# Patient Record
Sex: MALE | URBAN - METROPOLITAN AREA
[De-identification: names, ages, dates, MRNs, and addresses within clinical notes are randomized per-mention and may not be internally consistent; named-entity substitution may affect disease eponyms.]

---

## 2022-08-16 ENCOUNTER — APPOINTMENT (OUTPATIENT)
Age: 62
Setting detail: DERMATOLOGY
End: 2022-08-16

## 2022-08-16 DIAGNOSIS — M70.2 OLECRANON BURSITIS: ICD-10-CM

## 2022-08-16 PROBLEM — M70.22 OLECRANON BURSITIS, LEFT ELBOW: Status: ACTIVE | Noted: 2022-08-16

## 2022-08-16 PROCEDURE — 20605 DRAIN/INJ JOINT/BURSA W/O US: CPT

## 2022-08-16 PROCEDURE — OTHER MIPS QUALITY: OTHER

## 2022-08-16 PROCEDURE — OTHER COUNSELING: OTHER

## 2022-08-16 PROCEDURE — OTHER JOINT/BURSA ASPIRATION: OTHER

## 2022-08-16 ASSESSMENT — LOCATION ZONE DERM
LOCATION ZONE: ARM
LOCATION ZONE: ARM

## 2022-08-16 ASSESSMENT — LOCATION SIMPLE DESCRIPTION DERM
LOCATION SIMPLE: LEFT ELBOW
LOCATION SIMPLE: LEFT ELBOW

## 2022-08-16 ASSESSMENT — LOCATION DETAILED DESCRIPTION DERM
LOCATION DETAILED: LEFT ELBOW
LOCATION DETAILED: LEFT ELBOW

## 2022-08-16 NOTE — PROCEDURE: JOINT/BURSA ASPIRATION
Aspiration Total Volume (Cc): 7
Detail Level: Detailed
Ultrasound Probe: curvilinear probe
Ultrasound Guidance: no
Postcare: Patient can resume activities as tolerated. Please contact the office if there is swelling, redness or pain at the injection site.
Consent: The risks, benefits and alternatives of aspiration were discussed with the patient. Risks include infection and bleeding.  No certain guarantees have been made, patients understand that responses can vary and multiple procedures may be necessary. The patient was identified and timeout confirmed the correct site for the procedure.  The patient was positioned appropriately.
Bill Hcpcs?: yes